# Patient Record
Sex: FEMALE | Race: WHITE | URBAN - METROPOLITAN AREA
[De-identification: names, ages, dates, MRNs, and addresses within clinical notes are randomized per-mention and may not be internally consistent; named-entity substitution may affect disease eponyms.]

---

## 2017-01-30 ENCOUNTER — TELEPHONE (OUTPATIENT)
Dept: NEUROLOGY | Age: 72
End: 2017-01-30

## 2017-01-30 NOTE — TELEPHONE ENCOUNTER
Patient asks for a call back re she says she was supposed to have a test done before returning to see Dr. Leanne Cazares, but she isn't sure what kind of test it is. Before she makes a follow up appt, she'd like to know what to do. Also, she wants to know if Dr. Leanne Cazares has heard from her . She says she comes 200 miles so she doesn't want to come and it be a waste of her time. Please call.

## 2017-05-09 ENCOUNTER — OFFICE VISIT (OUTPATIENT)
Dept: CARDIOLOGY CLINIC | Age: 72
End: 2017-05-09

## 2017-05-09 VITALS
RESPIRATION RATE: 16 BRPM | DIASTOLIC BLOOD PRESSURE: 64 MMHG | HEIGHT: 62 IN | SYSTOLIC BLOOD PRESSURE: 108 MMHG | OXYGEN SATURATION: 98 % | HEART RATE: 78 BPM | BODY MASS INDEX: 31.8 KG/M2 | WEIGHT: 172.8 LBS

## 2017-05-09 DIAGNOSIS — Z01.818 PRE-OP EXAMINATION: ICD-10-CM

## 2017-05-09 DIAGNOSIS — R94.31 ABNORMAL EKG: Primary | ICD-10-CM

## 2017-05-09 PROBLEM — Z01.810 PREOP CARDIOVASCULAR EXAM: Status: ACTIVE | Noted: 2017-05-09

## 2017-05-09 RX ORDER — SERTRALINE HYDROCHLORIDE 100 MG/1
TABLET, FILM COATED ORAL DAILY
COMMUNITY

## 2017-05-09 RX ORDER — ROSUVASTATIN CALCIUM 20 MG/1
20 TABLET, COATED ORAL
COMMUNITY

## 2017-05-09 RX ORDER — LEVOTHYROXINE SODIUM 100 UG/1
TABLET ORAL
COMMUNITY

## 2017-05-09 RX ORDER — FERROUS SULFATE, DRIED 160(50) MG
1 TABLET, EXTENDED RELEASE ORAL 2 TIMES DAILY WITH MEALS
COMMUNITY

## 2017-05-09 RX ORDER — HYDROCHLOROTHIAZIDE 25 MG/1
25 TABLET ORAL DAILY
COMMUNITY

## 2017-05-09 RX ORDER — DEXTROAMPHETAMINE SACCHARATE, AMPHETAMINE ASPARTATE, DEXTROAMPHETAMINE SULFATE AND AMPHETAMINE SULFATE 2.5; 2.5; 2.5; 2.5 MG/1; MG/1; MG/1; MG/1
10 TABLET ORAL 2 TIMES DAILY
COMMUNITY

## 2017-05-09 RX ORDER — AMOXICILLIN 250 MG
1 CAPSULE ORAL DAILY
COMMUNITY

## 2017-05-09 RX ORDER — EZETIMIBE 10 MG/1
TABLET ORAL
COMMUNITY

## 2017-05-09 RX ORDER — CLONAZEPAM 1 MG/1
1 TABLET ORAL 3 TIMES DAILY
COMMUNITY

## 2017-05-09 RX ORDER — TRAZODONE HYDROCHLORIDE 150 MG/1
150 TABLET ORAL
COMMUNITY

## 2017-05-09 RX ORDER — OMEPRAZOLE 20 MG/1
20 TABLET, DELAYED RELEASE ORAL
COMMUNITY

## 2017-05-09 NOTE — MR AVS SNAPSHOT
Visit Information Date & Time Provider Department Dept. Phone Encounter #  
 5/9/2017 10:00 AM Andrés Perez MD [facility] Jes Dominguez [phone] [id number] Upcoming Health Maintenance Date Due DTaP/Tdap/Td series (1 - Tdap) 4/8/1965 BREAST CANCER SCRN MAMMOGRAM 4/8/1994 FOBT Q 1 YEAR AGE 50-75 4/8/1994 ZOSTER VACCINE AGE 60> 4/8/2004 GLAUCOMA SCREENING Q2Y 4/8/2009 OSTEOPOROSIS SCREENING (DEXA) 4/8/2009 Pneumococcal 65+ Low/Medium Risk (1 of 2 - PCV13) 4/8/2009 MEDICARE YEARLY EXAM 4/8/2009 INFLUENZA AGE 9 TO ADULT 8/1/2017 Allergies as of 5/9/2017  Review Complete On: 5/9/2017 By: Tg Altamirano LPN Severity Noted Reaction Type Reactions Codeine High 05/09/2017    Swelling Eyes Current Immunizations  Never Reviewed No immunizations on file. Not reviewed this visit You Were Diagnosed With   
  
 Codes Comments Pre-op examination    -  Primary ICD-10-CM: N47.907 ICD-9-CM: V72.84 Vitals BP Pulse Resp Height(growth percentile) Weight(growth percentile) SpO2  
 108/64 (BP 1 Location: Left arm, BP Patient Position: Sitting) 78 16 5' 2\" (1.575 m) 172 lb 12.8 oz (78.4 kg) 98% BMI  
  
  
  
  
 31.61 kg/m2 Vitals History BMI and BSA Data Body Mass Index Body Surface Area  
 31.61 kg/m 2 1.85 m 2 Preferred Pharmacy Pharmacy Name Phone [address] [phone] Your Updated Medication List  
  
   
This list is accurate as of: 5/9/17 10:57 AM.  Always use your most recent med list.  
  
  
  
  
 ADDERALL 10 mg tablet Generic drug:  dextroamphetamine-amphetamine Take 10 mg by mouth two (2) times a day. calcium-vitamin D 500 mg(1,250mg) -200 unit per tablet Commonly known as:  OYSTER SHELL Take 1 Tab by mouth two (2) times daily (with meals). clonazePAM 1 mg tablet Commonly known as:  Sheldon Holm  
 Take 1 mg by mouth three (3) times daily. CRESTOR 20 mg tablet Generic drug:  rosuvastatin Take 20 mg by mouth nightly. hydroCHLOROthiazide 25 mg tablet Commonly known as:  HYDRODIURIL Take 25 mg by mouth daily. levothyroxine 100 mcg tablet Commonly known as:  SYNTHROID Take  by mouth Daily (before breakfast). POTASSIUM CHLORIDE SR 10 MEQ TAB Take 10 mEq by mouth daily. PriLOSEC OTC 20 mg tablet Generic drug:  omeprazole Take 20 mg by mouth daily as needed. senna-docusate 8.6-50 mg per tablet Commonly known as:  Jeral Bevel Take 1 Tab by mouth daily. sertraline 100 mg tablet Commonly known as:  ZOLOFT Take  by mouth daily. TAB-A-RERE WOMAN PO Take  by mouth. traZODone 150 mg tablet Commonly known as:  Mariea [place] Take 150 mg by mouth nightly. ZETIA 10 mg tablet Generic drug:  ezetimibe Take  by mouth. We Performed the Following AMB POC EKG ROUTINE W/ 12 LEADS, INTER & REP [01260 CPT(R)] Introducing [facility]! Suzanne Hester introduces [employer] patient portal. Now you can access parts of your medical record, email your doctor's office, and request medication refills online. 1. In your internet browser, go to [email or web address] 2. Click on the First Time User? Click Here link in the Sign In box. You will see the New Member Sign Up page. 3. Enter your [employer] Access Code exactly as it appears below. You will not need to use this code after youve completed the sign-up process. If you do not sign up before the expiration date, you must request a new code. · [employer] Access Code: [email or web address] Expires: 8/7/2017 10:57 AM 
 
4. Enter the last four digits of your Social Security Number (xxxx) and Date of Birth (mm/dd/yyyy) as indicated and click Submit. You will be taken to the next sign-up page. 5. Create a [employer] ID.  This will be your [employer] login ID and cannot be changed, so think of one that is secure and easy to remember. 6. Create a [employer] password. You can change your password at any time. 7. Enter your Password Reset Question and Answer. This can be used at a later time if you forget your password. 8. Enter your e-mail address. You will receive e-mail notification when new information is available in [address] 9. Click Sign Up. You can now view and download portions of your medical record. 10. Click the Download Summary menu link to download a portable copy of your medical information. If you have questions, please visit the Frequently Asked Questions section of the [employer] website. Remember, [employer] is NOT to be used for urgent needs. For medical emergencies, dial 911. Now available from your iPhone and Android! Please provide this summary of care documentation to your next provider. Your primary care clinician is listed as Phys Other. If you have any questions after today's visit, please call [phone].

## 2019-09-25 PROBLEM — Z01.810 PREOP CARDIOVASCULAR EXAM: Status: RESOLVED | Noted: 2017-05-09 | Resolved: 2019-09-25
